# Patient Record
Sex: FEMALE | Race: WHITE | NOT HISPANIC OR LATINO
[De-identification: names, ages, dates, MRNs, and addresses within clinical notes are randomized per-mention and may not be internally consistent; named-entity substitution may affect disease eponyms.]

---

## 2018-10-29 ENCOUNTER — TRANSCRIPTION ENCOUNTER (OUTPATIENT)
Age: 58
End: 2018-10-29

## 2018-11-02 ENCOUNTER — EMERGENCY (EMERGENCY)
Facility: HOSPITAL | Age: 58
LOS: 1 days | Discharge: HOME | End: 2018-11-02
Attending: EMERGENCY MEDICINE
Payer: COMMERCIAL

## 2018-11-02 VITALS
WEIGHT: 190.04 LBS | TEMPERATURE: 98 F | DIASTOLIC BLOOD PRESSURE: 94 MMHG | HEART RATE: 100 BPM | HEIGHT: 65 IN | SYSTOLIC BLOOD PRESSURE: 154 MMHG | RESPIRATION RATE: 18 BRPM | OXYGEN SATURATION: 98 %

## 2018-11-02 PROCEDURE — 99282 EMERGENCY DEPT VISIT SF MDM: CPT

## 2018-11-02 PROCEDURE — 99284 EMERGENCY DEPT VISIT MOD MDM: CPT

## 2018-11-02 RX ORDER — DEXAMETHASONE 0.5 MG/5ML
2 ELIXIR ORAL
Qty: 10 | Refills: 0 | OUTPATIENT
Start: 2018-11-02 | End: 2018-11-06

## 2018-11-02 RX ORDER — BENZOCAINE AND MENTHOL 5; 1 G/100ML; G/100ML
1 LIQUID ORAL
Qty: 30 | Refills: 0 | OUTPATIENT
Start: 2018-11-02 | End: 2018-11-07

## 2018-11-02 RX ORDER — AZITHROMYCIN 500 MG/1
1 TABLET, FILM COATED ORAL
Qty: 1 | Refills: 0 | OUTPATIENT
Start: 2018-11-02 | End: 2018-11-06

## 2018-11-02 NOTE — ED ADULT NURSE NOTE - OBJECTIVE STATEMENT
pt from home c/o of sore throat, hoarseness voice and nonproductive cough x2 weeks pt is alert awake oriented x3 denies any fever at home no acute respiratory distress noted

## 2018-11-02 NOTE — ED ADULT NURSE NOTE - DISCHARGE DATE/TIME
Latex:  Patient denies allergy to latex.  Medications reviewed with patient.  Tobacco use verified.  Allergies verified.      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  When did the main problem begin?   09/17  2.  Describe the injury and/or pain or other complaints:  C/O right leg pain for about 1 month. Limping for 1 month in 09/17 and 12/17.  3.  Symptoms:  spasms, cramping, tingling, numbness only on the medial right lower leg and pain  4.  Is there any other pertinent background information?   None  5.  Has the patient had any treatment yet?   Norco-didn't take due to work, Flexeril at night, Naproxen q am  Chiro x2 with no relief  Medrol dspk really helped.        REFERRING PHYSICIAN:  Keny Menjivar DO  PMD - Keny Menjivar DO      PAST MEDICAL HISTORY  Past Medical History:   Diagnosis Date   • Acute ethmoidal sinusitis 6/5/2013   • Acute sinusitis 4/24/2012   • Alcohol abuse 1/31/2014   • Anxiety 7/24/2011   • Asbestos exposure    • COPD (chronic obstructive pulmonary disease) (CMS/[facility]) 1/31/2014   • Depression 7/24/2011   • HTN (hypertension) 7/24/2011   • Lymphadenopathy of left cervical region 3/15/2015   • Tobacco abuse 7/24/2011       PAST SURGICAL HISTORY  Past Surgical History:   Procedure Laterality Date   • No past surgeries            02-Nov-2018 11:53

## 2018-11-02 NOTE — ED PROVIDER NOTE - OBJECTIVE STATEMENT
Pertinent PMH/PSH/FHx/SHx and Review of Systems contained within:  58f hx of copd pw 2 weeks of cough and sore throat. patient notes she has been to urgent care for this on 2 occ and received augmentin 10/25 and then again on 10/30. she notes continued cough but that its getting better. she has no measured fever but has sweats. no ha, vision change, nausea, vomiting, rhinorrhea, stridor, abd pain, dysuria, rash, bleeding.   Fh and Sh not otherwise contributory  ROS otherwise negative

## 2018-11-02 NOTE — ED PROVIDER NOTE - PHYSICAL EXAMINATION
Gen: Alert, NAD  Head: NC, AT   Eyes: PERRL, EOMI, normal lids/conjunctiva  ENT: normal hearing, patent oropharynx without erythema/exudate, uvula midline. hoarse voice  Neck: supple, no tenderness, Trachea midline  Pulm: Bilateral BS, normal resp effort, no wheeze/stridor/retractions. dry cough at bedside.   CV: RRR, no M/R/G, 2+ radial and dp pulses bl, no edema  Abd: soft, NT/ND, +BS, no hepatosplenomegaly  Mskel: extremities x4 with normal ROM and no joint effusions. no ctl spine ttp.   Skin: no rash, no bruising   Neuro: AAOx3, no sensory/motor deficits, CN 2-12 intact

## 2018-11-02 NOTE — ED PROVIDER NOTE - MEDICAL DECISION MAKING DETAILS
patient pw acute bronchitis having tried multiple treatment regimens. will put her on zpack, steroids, albuterol and anti-tussive. okay for dc home.

## 2018-11-02 NOTE — ED ADULT TRIAGE NOTE - CHIEF COMPLAINT QUOTE
C/o hoarseness x 6 days with productive cough.  Taking prednisone 50mg. Stopped Augmentin after 5 days. Presented to triage on albuterol nebulizer. Hx COPD

## 2020-08-27 ENCOUNTER — TRANSCRIPTION ENCOUNTER (OUTPATIENT)
Age: 60
End: 2020-08-27

## 2020-08-27 ENCOUNTER — EMERGENCY (EMERGENCY)
Facility: HOSPITAL | Age: 60
LOS: 1 days | End: 2020-08-27
Admitting: EMERGENCY MEDICINE
Payer: COMMERCIAL

## 2020-08-27 PROCEDURE — 74177 CT ABD & PELVIS W/CONTRAST: CPT | Mod: 26

## 2020-08-27 PROCEDURE — 99285 EMERGENCY DEPT VISIT HI MDM: CPT

## 2023-05-26 ENCOUNTER — APPOINTMENT (OUTPATIENT)
Dept: HEPATOLOGY | Facility: CLINIC | Age: 63
End: 2023-05-26

## 2023-07-25 ENCOUNTER — APPOINTMENT (OUTPATIENT)
Dept: OPHTHALMOLOGY | Facility: CLINIC | Age: 63
End: 2023-07-25
Payer: COMMERCIAL

## 2023-07-25 ENCOUNTER — NON-APPOINTMENT (OUTPATIENT)
Age: 63
End: 2023-07-25

## 2023-07-25 PROCEDURE — 99203 OFFICE O/P NEW LOW 30 MIN: CPT

## 2024-04-23 ENCOUNTER — APPOINTMENT (OUTPATIENT)
Dept: OPHTHALMOLOGY | Facility: CLINIC | Age: 64
End: 2024-04-23

## 2024-04-25 ENCOUNTER — NON-APPOINTMENT (OUTPATIENT)
Age: 64
End: 2024-04-25